# Patient Record
Sex: MALE | Race: WHITE | URBAN - METROPOLITAN AREA
[De-identification: names, ages, dates, MRNs, and addresses within clinical notes are randomized per-mention and may not be internally consistent; named-entity substitution may affect disease eponyms.]

---

## 2017-01-06 ENCOUNTER — HOSPITAL ENCOUNTER (EMERGENCY)
Facility: CLINIC | Age: 37
Discharge: HOME | End: 2017-01-06
Attending: STUDENT IN AN ORGANIZED HEALTH CARE EDUCATION/TRAINING PROGRAM | Admitting: STUDENT IN AN ORGANIZED HEALTH CARE EDUCATION/TRAINING PROGRAM
Payer: COMMERCIAL

## 2017-01-06 VITALS
TEMPERATURE: 98 F | OXYGEN SATURATION: 97 % | DIASTOLIC BLOOD PRESSURE: 93 MMHG | RESPIRATION RATE: 16 BRPM | SYSTOLIC BLOOD PRESSURE: 143 MMHG

## 2017-01-06 DIAGNOSIS — F41.9 ANXIETY: ICD-10-CM

## 2017-01-06 PROCEDURE — 99284 EMERGENCY DEPT VISIT MOD MDM: CPT | Performed by: STUDENT IN AN ORGANIZED HEALTH CARE EDUCATION/TRAINING PROGRAM

## 2017-01-06 PROCEDURE — 99285 EMERGENCY DEPT VISIT HI MDM: CPT | Mod: 25

## 2017-01-06 PROCEDURE — 99285 EMERGENCY DEPT VISIT HI MDM: CPT

## 2017-01-06 PROCEDURE — 90791 PSYCH DIAGNOSTIC EVALUATION: CPT

## 2017-01-06 NOTE — ED AVS SNAPSHOT
[facility] Emergency Department    [address] [address]    Phone:  [phone]    Fax:  [phone]                                       Tl Ortega   MRN: [id number]    Department:  [facility] Emergency Department   Date of Visit:  1/6/2017           After Visit Summary Signature Page     I have received my discharge instructions, and my questions have been answered. I have discussed any challenges I see with this plan with the nurse or doctor.    ..........................................................................................................................................  Patient/Patient Representative Signature      ..........................................................................................................................................  Patient Representative Print Name and Relationship to Patient    ..................................................               ................................................  Date                                            Time    ..........................................................................................................................................  Reviewed by Signature/Title    ...................................................              ..............................................  Date                                                            Time

## 2017-01-06 NOTE — LETTER
[facility] EMERGENCY DEPARTMENT  [address] [address]  [phone]    2017    Tl Ortega  [address]   [facility] [id number] [phone] (home)     : 1980      To Whom it may concern:    Tl Ortega was seen in our Emergency Department today, 2017.  I expect his condition to improve over the next 2-4 days.  He may return to work when improved.    Sincerely,        Claudio Jauregui

## 2017-01-06 NOTE — ED AVS SNAPSHOT
[facility] Emergency Department    [address] [address]    Phone:  [phone]    Fax:  [phone]                                       Tl Ortega   MRN: [id number]    Department:  [facility] Emergency Department   Date of Visit:  1/6/2017           Patient Information     Date Of Birth          1980        Your diagnoses for this visit were:     Anxiety        You were seen by Claudio Jauregui DO.      Follow-up Information     Follow up with [facility]. Schedule an appointment as soon as possible for a visit in 3 days.    Why:  Followup for reevaluation and managment planning.    Contact information:    [address] [address] [phone]      Discharge References/Attachments     ANXIETY DISORDERS, TREATING, WITH THERAPY   (ENGLISH)    ANXIETY DISORDERS, TREATING, WITH MEDICATION (ENGLISH)    ANXIETY REACTION (ENGLISH)      24 Hour Appointment Hotline       To make an appointment at any [facility], call [phone] ([phone]). If you don't have a family doctor or clinic, we will help you find one. [facility] are conveniently located to serve the needs of you and your family.             Review of your medicines      Notice     You have not been prescribed any medications.            Orders Needing Specimen Collection     None      Pending Results     No orders found from 1/5/2017 to 1/7/2017.            Pending Culture Results     No orders found from 1/5/2017 to 1/7/2017.             Test Results from your hospital stay            Thank you for choosing [facility]       Thank you for choosing [facility] for your care. Our goal is always to provide you with excellent care. Hearing back from our patients is one way we can continue to improve our services. Please take a few minutes to complete the written survey that you may receive in the mail after you visit with us. Thank you!        MyChart Information      "[employer] lets you send messages to your doctor, view your test results, renew your prescriptions, schedule appointments and more. To sign up, go to [email or web address] . Click on \"Log in\" on the left side of the screen, which will take you to the Welcome page. Then click on \"Sign up Now\" on the right side of the page.     You will be asked to enter the access code listed below, as well as some personal information. Please follow the directions to create your username and password.     Your access code is: [email or web address]  Expires: 2017  9:51 PM     Your access code will  in 90 days. If you need help or a new code, please call your [facility] clinic or [phone].        Care EveryWhere ID     This is your Care EveryWhere ID. This could be used by other organizations to access your [facility] medical records  [id number]        After Visit Summary       This is your record. Keep this with you and show to your community pharmacist(s) and doctor(s) at your next visit.                  "

## 2017-01-07 NOTE — ED NOTES
"Pt cooperative,\" I am here for help\"  No thoughts to act on at this time  [facility] set up for pt evaluation  "
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
## 2017-01-19 ENCOUNTER — OFFICE VISIT (OUTPATIENT)
Dept: UROLOGY | Facility: CLINIC | Age: 37
End: 2017-01-19
Payer: COMMERCIAL

## 2017-01-19 VITALS — HEART RATE: 94 BPM | DIASTOLIC BLOOD PRESSURE: 80 MMHG | SYSTOLIC BLOOD PRESSURE: 133 MMHG | RESPIRATION RATE: 16 BRPM

## 2017-01-19 DIAGNOSIS — Z87.440 PERSONAL HISTORY OF URINARY TRACT INFECTION: Primary | ICD-10-CM

## 2017-01-19 LAB
ALBUMIN UR-MCNC: NEGATIVE MG/DL
APPEARANCE UR: NORMAL
BILIRUB UR QL STRIP: NEGATIVE
COLOR UR AUTO: YELLOW
GLUCOSE UR STRIP-MCNC: NEGATIVE MG/DL
HGB UR QL STRIP: NEGATIVE
KETONES UR STRIP-MCNC: NEGATIVE MG/DL
LEUKOCYTE ESTERASE UR QL STRIP: NEGATIVE
MUCOUS THREADS #/AREA URNS LPF: PRESENT /LPF
NITRATE UR QL: NEGATIVE
PH UR STRIP: 5.5 PH (ref 5–7)
RBC #/AREA URNS AUTO: ABNORMAL /HPF (ref 0–2)
SP GR UR STRIP: >1.03 (ref 1–1.03)
URATE CRY #/AREA URNS HPF: ABNORMAL /HPF
URN SPEC COLLECT METH UR: NORMAL
UROBILINOGEN UR STRIP-ACNC: 0.2 EU/DL (ref 0.2–1)
WBC #/AREA URNS AUTO: ABNORMAL /HPF (ref 0–2)

## 2017-01-19 PROCEDURE — 87086 URINE CULTURE/COLONY COUNT: CPT | Performed by: UROLOGY

## 2017-01-19 PROCEDURE — 81001 URINALYSIS AUTO W/SCOPE: CPT | Performed by: UROLOGY

## 2017-01-19 PROCEDURE — 99203 OFFICE O/P NEW LOW 30 MIN: CPT | Mod: 25 | Performed by: UROLOGY

## 2017-01-19 PROCEDURE — 87491 CHLMYD TRACH DNA AMP PROBE: CPT | Performed by: UROLOGY

## 2017-01-19 PROCEDURE — 52281 CYSTOSCOPY AND TREATMENT: CPT | Performed by: UROLOGY

## 2017-01-19 PROCEDURE — 51798 US URINE CAPACITY MEASURE: CPT | Performed by: UROLOGY

## 2017-01-19 PROCEDURE — 87591 N.GONORRHOEAE DNA AMP PROB: CPT | Performed by: UROLOGY

## 2017-01-19 RX ORDER — CEPHALEXIN 500 MG/1
500 CAPSULE ORAL 3 TIMES DAILY
Qty: 15 CAPSULE | Refills: 0 | Status: SHIPPED | OUTPATIENT
Start: 2017-01-19 | End: 2017-02-02

## 2017-01-19 NOTE — NURSING NOTE
"Chief Complaint   Patient presents with     Urinary Problem     Trouble urinating. Believes that there may be a foreign object obstructing flow       Initial /80 mmHg  Pulse 94  Resp 16 Estimated body mass index is 21.96 kg/(m^2) as calculated from the following:    Height as of 4/28/15: 5' 8\" (1.727 m).    Weight as of 4/28/15: 144 lb 6.4 oz (65.499 kg).  BP completed using cuff size: regular    Lavinia Cason MA      "

## 2017-01-19 NOTE — NURSING NOTE
Active order to obtain bladder scan? Yes   Name of ordering provider:  Dr. Mitchell  Bladder scan preformed post void Yes:.  Bladder scan reveled 17ML  Provider notified?  Yes    Lavinia Cason MA

## 2017-01-19 NOTE — PATIENT INSTRUCTIONS
Apply bacitracin ointment to the end of the penis twice daily.    Ibuprofen 600 mg daily (three 200 mg tablets three times each day) for five days. Take these with food if possible.    Antibiotic (Keflex 500 mg) three times each day for five days.    Return to urology in about two weeks.

## 2017-01-19 NOTE — MR AVS SNAPSHOT
"              After Visit Summary   1/19/2017    Tl Ortega    MRN: [id number]           Patient Information     Date Of Birth          1980        Visit Information        Provider Department      1/19/2017 11:30 AM INDIGO Mitchell MD [facility]        Today's Diagnoses     Personal history of urinary tract infection    -  1       Care Instructions    Apply bacitracin ointment to the end of the penis twice daily.    Ibuprofen 600 mg daily (three 200 mg tablets three times each day) for five days. Take these with food if possible.    Antibiotic (Keflex 500 mg) three times each day for five days.    Return to urology in about two weeks.        Follow-ups after your visit        Who to contact     If you have questions or need follow up information about today's clinic visit or your schedule please contact [facility] directly at [phone].  Normal or non-critical lab and imaging results will be communicated to you by [employer], letter or phone within 4 business days after the clinic has received the results. If you do not hear from us within 7 days, please contact the clinic through [employer] or phone. If you have a critical or abnormal lab result, we will notify you by phone as soon as possible.  Submit refill requests through [employer] or call your pharmacy and they will forward the refill request to us. Please allow 3 business days for your refill to be completed.          Additional Information About Your Visit        MyChart Information     [employer] lets you send messages to your doctor, view your test results, renew your prescriptions, schedule appointments and more. To sign up, go to [email or web address] . Click on \"Log in\" on the left side of the screen, which will take you to the Welcome page. Then click on \"Sign up Now\" on the right side of the page.     You will be asked to enter the access code listed below, as well as some personal information. Please follow the " directions to create your username and password.     Your access code is: [email or web address]  Expires: 2017  9:51 PM     Your access code will  in 90 days. If you need help or a new code, please call your [place] clinic or [phone].        Care EveryWhere ID     This is your Care EveryWhere ID. This could be used by other organizations to access your [facility] medical records  [id number]        Your Vitals Were     Pulse Respirations                94 16           Blood Pressure from Last 3 Encounters:   17 133/80   17 143/93   04/28/15 143/93    Weight from Last 3 Encounters:   04/28/15 144 lb 6.4 oz (65.499 kg)   08 160 lb (72.576 kg)   08 165 lb (74.844 kg)              We Performed the Following     *UA reflex to Microscopic     CYSTOURETHROSCOPY     MEASURE POST-VOID RESIDUAL URINE/BLADDER CAPACITY, US NON-IMAGING     Urine Culture Aerobic Bacterial          Today's Medication Changes          These changes are accurate as of: 17 12:54 PM.  If you have any questions, ask your nurse or doctor.               Start taking these medicines.        Dose/Directions    cephALEXin 500 MG capsule   Commonly known as:  KEFLEX   Used for:  Personal history of urinary tract infection   Started by:  INDIGO Mitchell MD        Dose:  500 mg   Take 1 capsule (500 mg) by mouth 3 times daily   Quantity:  15 capsule   Refills:  0            Where to get your medicines      These medications were sent to [facility] [address] [place] [address]  [address] [facility] [address]     Phone:  [phone]    - cephALEXin 500 MG capsule             Primary Care Provider    [facility]       No address on file        Thank you!     Thank you for choosing [facility]  for your care. Our goal is always to provide you with excellent care. Hearing back from our patients is one way we can continue to improve our services. Please take a few minutes to  complete the written survey that you may receive in the mail after your visit with us. Thank you!             Your Updated Medication List - Protect others around you: Learn how to safely use, store and throw away your medicines at www.disposemymeds.org.          This list is accurate as of: 1/19/17 12:54 PM.  Always use your most recent med list.                   Brand Name Dispense Instructions for use    cephALEXin 500 MG capsule    KEFLEX    15 capsule    Take 1 capsule (500 mg) by mouth 3 times daily

## 2017-01-20 LAB
C TRACH DNA SPEC QL NAA+PROBE: ABNORMAL
N GONORRHOEA DNA SPEC QL NAA+PROBE: NORMAL
SPECIMEN SOURCE: ABNORMAL
SPECIMEN SOURCE: NORMAL
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
## 2017-01-21 LAB
BACTERIA SPEC CULT: NO GROWTH
MICRO REPORT STATUS: NORMAL
SPECIMEN SOURCE: NORMAL

## 2017-02-02 ENCOUNTER — OFFICE VISIT (OUTPATIENT)
Dept: UROLOGY | Facility: CLINIC | Age: 37
End: 2017-02-02
Payer: COMMERCIAL

## 2017-02-02 VITALS
WEIGHT: 159 LBS | HEART RATE: 73 BPM | TEMPERATURE: 97.5 F | DIASTOLIC BLOOD PRESSURE: 90 MMHG | SYSTOLIC BLOOD PRESSURE: 127 MMHG | BODY MASS INDEX: 24.18 KG/M2

## 2017-02-02 DIAGNOSIS — Z87.440 PERSONAL HISTORY OF URINARY TRACT INFECTION: Primary | ICD-10-CM

## 2017-02-02 PROCEDURE — 87086 URINE CULTURE/COLONY COUNT: CPT | Performed by: UROLOGY

## 2017-02-02 PROCEDURE — 99213 OFFICE O/P EST LOW 20 MIN: CPT | Performed by: UROLOGY

## 2017-02-02 RX ORDER — DOXYCYCLINE HYCLATE 100 MG
100 TABLET ORAL 2 TIMES DAILY
Qty: 20 TABLET | Refills: 0 | Status: SHIPPED | OUTPATIENT
Start: 2017-02-02 | End: 2017-02-17

## 2017-02-02 ASSESSMENT — PAIN SCALES - GENERAL: PAINLEVEL: MODERATE PAIN (4)

## 2017-02-02 NOTE — Clinical Note
SURGERYPLANNING/SCHEDULING WORKSHEET                              [facility]  [address]  [facility] [address] [phone]                          Tl Ortega                :  1980  MRN:  [id number]  Phone: [phone] (home)    Same Day Surgery [phone]   Surgeon: INDIGO Mitchell MD, MD  Diagnosis:   Urethral stricture  Allergies:  Review of patient's allergies indicates no known allergies.   A preoperative evaluation by the primary care provider has been requested to summarize and modify, where possible, medical risks to the proposed surgery.  Specific risks requiring evaluation include   Patient Active Problem List    Diagnosis     Encounter for other general counseling or advice on contraception     Tobacco use disorder       ====================================================  Surgical Procedure:  Urethral dilation  Length of Procedure:  20 minutes  Type of anesthesia:  Local with MAC  The proposed surgical procedure is considered INTERMEDIATE risk.  Date of Procedure:________________    Time: _____________________       Informed Consent Obtained and Signed:  NO  ====================================================  Instructions to Same Day Surgery Staff  none  Special Equipment: None  Preop Antibiotic:  none  Preop Pain Meds:  None  PreOp Orders:  Routine Standing Orders.  ====================================================  Instructions to the patient:  Preop physical exam scheduled (within 30 days or 7 days prior) with:   ____________________  Clinic:  ____________________                                         Date______________Time_________________________  Come to the hospital at: ________________________________  HOME PREPARATION:   Shower with Hibiclens the night before and the morning of surgery, gently cleaning skin from neck to feet  Bathe and brush teeth the morning of surgery.  Take  medications with a sip of water the morning of surgery:   Check with  if taking insulin.  May have  a light meal, toast and clear liquids, up to 6 hrs before surgery  May have clear liquids (liquids one can read through) up to 2 hrs before surgery  NOTHING after 2 hrs before surgery  Stop aspirin 7-10 days before surgery  Stop NSAIDS (Ibuproven, Naproxen, etc) 5 days before surgery  Stop Plavix 7-10 days before surgery      INDIGO Mitchell MD, MD    2/2/2017

## 2017-02-02 NOTE — NURSING NOTE
"Initial /90 mmHg  Pulse 73  Temp(Src) 97.5  F (36.4  C) (Oral)  Wt 72.122 kg (159 lb) Estimated body mass index is 24.18 kg/(m^2) as calculated from the following:    Height as of 4/28/15: 1.727 m (5' 8\").    Weight as of this encounter: 72.122 kg (159 lb). .    Akua Martinez LPN    "

## 2017-02-04 LAB
BACTERIA SPEC CULT: NORMAL
MICRO REPORT STATUS: NORMAL
SPECIMEN SOURCE: NORMAL

## 2017-02-04 NOTE — PROGRESS NOTES
Appointment source: Established Patient  Patient name: Tl AMAYA [facility]  Urology Staff: Ralph Mitchell MD    Subjective: This is a 36 year old year old male returning for follow up of urethritis    Objective:  Urine culture was negative but chlamydia culture was postive.    On examination today the urethral inflammation continues.    Assessment:  Chlamydia urethritis    Plan:  Doxycycline 100 mg BID for seven days. Follow up in two weeks.    Total time 20 minutes, counseling 15 minutes discussing urethritis

## 2017-02-10 ENCOUNTER — TELEPHONE (OUTPATIENT)
Dept: UROLOGY | Facility: CLINIC | Age: 37
End: 2017-02-10

## 2017-02-10 NOTE — TELEPHONE ENCOUNTER
No [facility] reporting information found on positive Chlamydia for this patient. Please advise date reported and who reports.    Thank you,     [facility]

## 2017-02-17 DIAGNOSIS — Z87.440 PERSONAL HISTORY OF URINARY TRACT INFECTION: ICD-10-CM

## 2017-02-17 RX ORDER — DOXYCYCLINE HYCLATE 100 MG
100 TABLET ORAL 2 TIMES DAILY
Qty: 20 TABLET | Refills: 0 | Status: SHIPPED | OUTPATIENT
Start: 2017-02-17

## 2017-02-17 NOTE — TELEPHONE ENCOUNTER
I left a message for Tl that we are return his call. The phone # was given for him to call back. Joseph

## 2017-02-17 NOTE — TELEPHONE ENCOUNTER
I spoke to Tl today. He is asking for more antibiotic and preferably a different antibiotic then Doxycycline. He says that he continues to have some drainage coming from the urethra. He says that he has no other symptoms other than the drainage. He wants an antibiotic sent to the [place] [facility] pharmacy in [place]. He understands that Dr Mitchell will return to clinic on Monday.  Dr Mitchell , how do you advise? Jessica PASTOR RN

## 2017-02-17 NOTE — TELEPHONE ENCOUNTER
Message is left on Tl's personal voice mail ,that Dr Mitchell has sent in an Rx for him. It is the same antibiotic that was previously given. Dr Mitchell says that the Doxycycline is specific to [place] particular needs. Jessica PASTOR RN

## 2017-02-17 NOTE — TELEPHONE ENCOUNTER
Reason for Call:  Other prescription    Detailed comments: pt calling stating he has an appt coming up on the 28th. He is out of the doxycycline but feels he still has an infection. Would like a refill on this or maybe a different medication that will work better at getting rid of the infection    Phone Number Patient can be reached at: Cell number on file:    Telephone Information:   Mobile [phone]       Best Time: any     Can we leave a detailed message on this number? YES    Call taken on 2/17/2017 at 11:53 AM by Damaris Hallman